# Patient Record
Sex: MALE | Race: WHITE | NOT HISPANIC OR LATINO | Employment: OTHER | URBAN - METROPOLITAN AREA
[De-identification: names, ages, dates, MRNs, and addresses within clinical notes are randomized per-mention and may not be internally consistent; named-entity substitution may affect disease eponyms.]

---

## 2022-04-11 ENCOUNTER — HISTORICAL (OUTPATIENT)
Dept: ADMINISTRATIVE | Facility: HOSPITAL | Age: 72
End: 2022-04-11

## 2022-04-26 VITALS
BODY MASS INDEX: 34.75 KG/M2 | HEIGHT: 68 IN | SYSTOLIC BLOOD PRESSURE: 118 MMHG | WEIGHT: 229.25 LBS | DIASTOLIC BLOOD PRESSURE: 80 MMHG

## 2023-06-26 DIAGNOSIS — I71.40 ABDOMINAL AORTIC ANEURYSM: Primary | ICD-10-CM

## 2023-06-26 RX ORDER — CITALOPRAM 20 MG/1
20 TABLET, FILM COATED ORAL EVERY MORNING
COMMUNITY
Start: 2023-06-20

## 2023-06-26 RX ORDER — ATORVASTATIN CALCIUM 80 MG/1
80 TABLET, FILM COATED ORAL DAILY
COMMUNITY
Start: 2023-06-14

## 2023-06-26 RX ORDER — HYDROCHLOROTHIAZIDE 12.5 MG/1
12.5 CAPSULE ORAL DAILY
COMMUNITY
Start: 2023-06-15

## 2023-06-26 RX ORDER — BENAZEPRIL HYDROCHLORIDE 20 MG/1
20 TABLET ORAL 2 TIMES DAILY
COMMUNITY
Start: 2023-06-16

## 2023-06-26 RX ORDER — OMEPRAZOLE 40 MG/1
40 CAPSULE, DELAYED RELEASE ORAL DAILY
COMMUNITY
Start: 2023-06-11

## 2023-06-26 RX ORDER — TAMSULOSIN HYDROCHLORIDE 0.4 MG/1
1 CAPSULE ORAL DAILY
COMMUNITY
Start: 2023-05-31

## 2023-06-26 RX ORDER — ONDANSETRON 8 MG/1
4 TABLET, ORALLY DISINTEGRATING ORAL 3 TIMES DAILY
COMMUNITY
Start: 2023-04-14

## 2023-06-27 ENCOUNTER — OFFICE VISIT (OUTPATIENT)
Dept: CARDIAC SURGERY | Facility: CLINIC | Age: 73
End: 2023-06-27
Payer: COMMERCIAL

## 2023-06-27 VITALS
DIASTOLIC BLOOD PRESSURE: 70 MMHG | OXYGEN SATURATION: 97 % | HEIGHT: 67 IN | WEIGHT: 183 LBS | HEART RATE: 58 BPM | BODY MASS INDEX: 28.72 KG/M2 | SYSTOLIC BLOOD PRESSURE: 116 MMHG

## 2023-06-27 DIAGNOSIS — I71.43 INFRARENAL ABDOMINAL AORTIC ANEURYSM (AAA) WITHOUT RUPTURE: ICD-10-CM

## 2023-06-27 PROCEDURE — 1159F MED LIST DOCD IN RCRD: CPT | Mod: CPTII,,, | Performed by: SPECIALIST

## 2023-06-27 PROCEDURE — 4010F PR ACE/ARB THEARPY RXD/TAKEN: ICD-10-PCS | Mod: CPTII,,, | Performed by: SPECIALIST

## 2023-06-27 PROCEDURE — 3074F PR MOST RECENT SYSTOLIC BLOOD PRESSURE < 130 MM HG: ICD-10-PCS | Mod: CPTII,,, | Performed by: SPECIALIST

## 2023-06-27 PROCEDURE — 3074F SYST BP LT 130 MM HG: CPT | Mod: CPTII,,, | Performed by: SPECIALIST

## 2023-06-27 PROCEDURE — 3288F PR FALLS RISK ASSESSMENT DOCUMENTED: ICD-10-PCS | Mod: CPTII,,, | Performed by: SPECIALIST

## 2023-06-27 PROCEDURE — 3288F FALL RISK ASSESSMENT DOCD: CPT | Mod: CPTII,,, | Performed by: SPECIALIST

## 2023-06-27 PROCEDURE — 1159F PR MEDICATION LIST DOCUMENTED IN MEDICAL RECORD: ICD-10-PCS | Mod: CPTII,,, | Performed by: SPECIALIST

## 2023-06-27 PROCEDURE — 1101F PT FALLS ASSESS-DOCD LE1/YR: CPT | Mod: CPTII,,, | Performed by: SPECIALIST

## 2023-06-27 PROCEDURE — 3008F PR BODY MASS INDEX (BMI) DOCUMENTED: ICD-10-PCS | Mod: CPTII,,, | Performed by: SPECIALIST

## 2023-06-27 PROCEDURE — 3008F BODY MASS INDEX DOCD: CPT | Mod: CPTII,,, | Performed by: SPECIALIST

## 2023-06-27 PROCEDURE — 1126F AMNT PAIN NOTED NONE PRSNT: CPT | Mod: CPTII,,, | Performed by: SPECIALIST

## 2023-06-27 PROCEDURE — 99213 OFFICE O/P EST LOW 20 MIN: CPT | Mod: ,,, | Performed by: SPECIALIST

## 2023-06-27 PROCEDURE — 3078F PR MOST RECENT DIASTOLIC BLOOD PRESSURE < 80 MM HG: ICD-10-PCS | Mod: CPTII,,, | Performed by: SPECIALIST

## 2023-06-27 PROCEDURE — 3078F DIAST BP <80 MM HG: CPT | Mod: CPTII,,, | Performed by: SPECIALIST

## 2023-06-27 PROCEDURE — 1101F PR PT FALLS ASSESS DOC 0-1 FALLS W/OUT INJ PAST YR: ICD-10-PCS | Mod: CPTII,,, | Performed by: SPECIALIST

## 2023-06-27 PROCEDURE — 4010F ACE/ARB THERAPY RXD/TAKEN: CPT | Mod: CPTII,,, | Performed by: SPECIALIST

## 2023-06-27 PROCEDURE — 1126F PR PAIN SEVERITY QUANTIFIED, NO PAIN PRESENT: ICD-10-PCS | Mod: CPTII,,, | Performed by: SPECIALIST

## 2023-06-27 PROCEDURE — 99213 PR OFFICE/OUTPT VISIT, EST, LEVL III, 20-29 MIN: ICD-10-PCS | Mod: ,,, | Performed by: SPECIALIST

## 2023-06-27 RX ORDER — METOPROLOL SUCCINATE 25 MG/1
25 TABLET, EXTENDED RELEASE ORAL DAILY
COMMUNITY
Start: 2023-06-02

## 2023-06-27 RX ORDER — ASPIRIN 81 MG/1
1 TABLET ORAL EVERY MORNING
COMMUNITY

## 2023-06-27 RX ORDER — AMOXICILLIN AND CLAVULANATE POTASSIUM 500; 125 MG/1; MG/1
1 TABLET, FILM COATED ORAL 2 TIMES DAILY
COMMUNITY
Start: 2023-05-30

## 2023-06-27 NOTE — PROGRESS NOTES
[facility]  History & Physical  Cardiothoracic Surgery    SUBJECTIVE:     Chief Complaint/Reason for Visit:   Chief Complaint   Patient presents with    Pre-op Exam     Referral [facility] Dr Arevalo Known pt of Dr De La Garza        History of Present Illness:  Patient is a 73 y.o. male presents referred by Dr. Arevalo for evaluation of possible abdominal aneurism.  The patient is known to me from a previous open abdominal aortic aneurysm repair with a 16 mm Dacron interposition graft in 2020.  He was recently for cancer and had another CT scan in which the radiologist read it as a 4.4 cm abdominal aortic aneurysm.  I am able unable to really access these films adequately but I suspect the radiologist is simply reading the area where we oversewed the aneurysm sac as a persistent aneurism.  I am going to obtain a CTA of the abdomen for better clarity.    Review of patient's allergies indicates:  No Known Allergies    Past Medical History:   Diagnosis Date    AAA (abdominal aortic aneurysm) without rupture     Bladder cancer     CAD S/P percutaneous coronary angioplasty     GERD (gastroesophageal reflux disease)     HTN (hypertension)     Hyperlipidemia     Malignant neoplasm of bladder, unspecified     Obesity, unspecified     PAD (peripheral artery disease)     S/P drug eluting coronary stent placement     TIA (transient ischemic attack)     VHD (valvular heart disease)      Past Surgical History:   Procedure Laterality Date    ABDOMINAL AORTIC ANEURYSM REPAIR      CORONARY ARTERY BYPASS GRAFT (CABG)      LEFT HEART CATHETERIZATION      TOTAL KNEE ARTHROPLASTY Right     TOTAL KNEE ARTHROPLASTY Left      No family history on file.  Social History     Tobacco Use    Smoking status: Former     Types: Cigarettes    Smokeless tobacco: Never        Review of Systems:  Review of Systems   Constitutional: Negative.   HENT: Negative.     Eyes: Negative.    Cardiovascular: Negative.    Respiratory: Negative.      Endocrine: Negative.    Hematologic/Lymphatic: Negative.    Skin: Negative.    Musculoskeletal: Negative.         Walking with a walker   Gastrointestinal: Negative.    Genitourinary: Negative.    Neurological: Negative.    Psychiatric/Behavioral: Negative.     Allergic/Immunologic: Negative.      OBJECTIVE:     Vital Signs (Most Recent):  Pulse: (!) 58 (06/27/23 1334)  BP: 116/70 (06/27/23 1334)  SpO2: 97 % (06/27/23 1334)    Admission: Weight: 83 kg (183 lb) (06/27/23 1334)   Most Recent: Weight: 83 kg (183 lb) (06/27/23 1334)    Physical Exam:  Physical Exam  Vitals reviewed.   Constitutional:       Appearance: Normal appearance. He is ill-appearing.   HENT:      Head: Normocephalic and atraumatic.   Eyes:      Pupils: Pupils are equal, round, and reactive to light.   Cardiovascular:      Rate and Rhythm: Normal rate and regular rhythm.      Pulses: Normal pulses.      Heart sounds: Normal heart sounds.   Pulmonary:      Effort: Pulmonary effort is normal.      Breath sounds: Normal breath sounds.   Abdominal:      Palpations: Abdomen is soft.   Musculoskeletal:         General: Normal range of motion.      Cervical back: Normal range of motion.   Skin:     General: Skin is warm.   Neurological:      General: No focal deficit present.      Mental Status: He is alert and oriented to person, place, and time.   Psychiatric:         Mood and Affect: Mood normal.         Behavior: Behavior normal.       Diagnostic Results:  CT: Reviewed      ASSESSMENT/PLAN:     1. Abdominal aortic aneurysm      Previous open abdominal aortic aneurysm repair 3 years ago with a 16 mm interposition graft  New bladder cancer   CT read as new abdominal aneurysm 4.4 cm   Obtain CTA of abdomen for better clarity as this is likely simply the old aneurysm sac

## 2023-07-26 ENCOUNTER — HOSPITAL ENCOUNTER (OUTPATIENT)
Dept: RADIOLOGY | Facility: HOSPITAL | Age: 73
Discharge: HOME | End: 2023-07-26
Attending: SPECIALIST
Payer: COMMERCIAL

## 2023-07-26 DIAGNOSIS — I71.43 INFRARENAL ABDOMINAL AORTIC ANEURYSM (AAA) WITHOUT RUPTURE: ICD-10-CM

## 2023-07-26 LAB
CREAT SERPL-MCNC: 1.1 MG/DL (ref 0.5–1.4)
SAMPLE: NORMAL

## 2023-07-26 PROCEDURE — 74174 CTA ABD&PLVS W/CONTRAST: CPT | Mod: TC

## 2023-07-26 PROCEDURE — 25500020 PHARM REV CODE 255: Performed by: SPECIALIST

## 2023-07-26 RX ADMIN — IOPAMIDOL 100 ML: 755 INJECTION, SOLUTION INTRAVENOUS at 11:07
